# Patient Record
Sex: FEMALE | Race: WHITE | Employment: OTHER | URBAN - METROPOLITAN AREA
[De-identification: names, ages, dates, MRNs, and addresses within clinical notes are randomized per-mention and may not be internally consistent; named-entity substitution may affect disease eponyms.]

---

## 2023-09-18 ENCOUNTER — TRANSCRIBE ORDERS (OUTPATIENT)
Facility: HOSPITAL | Age: 68
End: 2023-09-18

## 2023-09-18 DIAGNOSIS — M25.572 LEFT ANKLE PAIN, UNSPECIFIED CHRONICITY: ICD-10-CM

## 2023-09-18 DIAGNOSIS — S92.022B: Primary | ICD-10-CM

## 2023-09-18 DIAGNOSIS — M79.672 LEFT FOOT PAIN: ICD-10-CM

## 2023-09-19 ENCOUNTER — HOSPITAL ENCOUNTER (OUTPATIENT)
Facility: HOSPITAL | Age: 68
Discharge: HOME | End: 2023-09-22
Attending: PODIATRIST
Payer: MEDICARE

## 2023-09-19 DIAGNOSIS — M79.672 LEFT FOOT PAIN: ICD-10-CM

## 2023-09-19 DIAGNOSIS — M25.572 LEFT ANKLE PAIN, UNSPECIFIED CHRONICITY: ICD-10-CM

## 2023-09-19 DIAGNOSIS — S92.022B: ICD-10-CM

## 2023-09-19 PROCEDURE — 73718 MRI LOWER EXTREMITY W/O DYE: CPT

## 2024-06-17 ENCOUNTER — HOSPITAL ENCOUNTER (INPATIENT)
Facility: HOSPITAL | Age: 69
LOS: 3 days | Discharge: HOME | End: 2024-06-21
Attending: EMERGENCY MEDICINE | Admitting: INTERNAL MEDICINE
Payer: MEDICARE

## 2024-06-17 ENCOUNTER — APPOINTMENT (OUTPATIENT)
Facility: HOSPITAL | Age: 69
End: 2024-06-17
Payer: MEDICARE

## 2024-06-17 DIAGNOSIS — E86.0 MODERATE DEHYDRATION: ICD-10-CM

## 2024-06-17 DIAGNOSIS — N17.9 AKI (ACUTE KIDNEY INJURY) (HCC): ICD-10-CM

## 2024-06-17 DIAGNOSIS — K65.4 MESENTERIC PANNICULITIS (HCC): ICD-10-CM

## 2024-06-17 DIAGNOSIS — R11.2 NAUSEA AND VOMITING, UNSPECIFIED VOMITING TYPE: Primary | ICD-10-CM

## 2024-06-17 DIAGNOSIS — N12 PYELONEPHRITIS: ICD-10-CM

## 2024-06-17 DIAGNOSIS — R10.13 ABDOMINAL PAIN, EPIGASTRIC: ICD-10-CM

## 2024-06-17 LAB
ALBUMIN SERPL-MCNC: 2.2 G/DL (ref 3.5–5)
ALBUMIN/GLOB SERPL: 0.4 (ref 1.1–2.2)
ALP SERPL-CCNC: 66 U/L (ref 45–117)
ALT SERPL-CCNC: 17 U/L (ref 12–78)
ANION GAP SERPL CALC-SCNC: 12 MMOL/L (ref 5–15)
APPEARANCE UR: ABNORMAL
AST SERPL-CCNC: 13 U/L (ref 15–37)
BACTERIA URNS QL MICRO: ABNORMAL /HPF
BASOPHILS # BLD: 0.1 K/UL (ref 0–0.1)
BASOPHILS NFR BLD: 1 % (ref 0–1)
BILIRUB SERPL-MCNC: 0.5 MG/DL (ref 0.2–1)
BILIRUB UR QL CFM: NEGATIVE
BUN SERPL-MCNC: 41 MG/DL (ref 6–20)
BUN/CREAT SERPL: 18 (ref 12–20)
CALCIUM SERPL-MCNC: 9.5 MG/DL (ref 8.5–10.1)
CHLORIDE SERPL-SCNC: 96 MMOL/L (ref 97–108)
CO2 SERPL-SCNC: 28 MMOL/L (ref 21–32)
COLOR UR: ABNORMAL
CREAT SERPL-MCNC: 2.33 MG/DL (ref 0.55–1.02)
DIFFERENTIAL METHOD BLD: ABNORMAL
EOSINOPHIL # BLD: 0.3 K/UL (ref 0–0.4)
EOSINOPHIL NFR BLD: 2 % (ref 0–7)
EPITH CASTS URNS QL MICRO: ABNORMAL /LPF
ERYTHROCYTE [DISTWIDTH] IN BLOOD BY AUTOMATED COUNT: 13.7 % (ref 11.5–14.5)
GLOBULIN SER CALC-MCNC: 6 G/DL (ref 2–4)
GLUCOSE SERPL-MCNC: 124 MG/DL (ref 65–100)
GLUCOSE UR STRIP.AUTO-MCNC: NEGATIVE MG/DL
GRAN CASTS URNS QL MICRO: ABNORMAL /LPF
HCT VFR BLD AUTO: 36.2 % (ref 35–47)
HGB BLD-MCNC: 11.5 G/DL (ref 11.5–16)
HGB UR QL STRIP: NEGATIVE
IMM GRANULOCYTES # BLD AUTO: 0.2 K/UL (ref 0–0.04)
IMM GRANULOCYTES NFR BLD AUTO: 1 % (ref 0–0.5)
KETONES UR QL STRIP.AUTO: 15 MG/DL
LEUKOCYTE ESTERASE UR QL STRIP.AUTO: ABNORMAL
LIPASE SERPL-CCNC: 58 U/L (ref 13–75)
LYMPHOCYTES # BLD: 1.9 K/UL (ref 0.8–3.5)
LYMPHOCYTES NFR BLD: 13 % (ref 12–49)
MCH RBC QN AUTO: 28.5 PG (ref 26–34)
MCHC RBC AUTO-ENTMCNC: 31.8 G/DL (ref 30–36.5)
MCV RBC AUTO: 89.6 FL (ref 80–99)
MONOCYTES # BLD: 0.5 K/UL (ref 0–1)
MONOCYTES NFR BLD: 4 % (ref 5–13)
NEUTS SEG # BLD: 11.4 K/UL (ref 1.8–8)
NEUTS SEG NFR BLD: 79 % (ref 32–75)
NITRITE UR QL STRIP.AUTO: NEGATIVE
NRBC # BLD: 0 K/UL (ref 0–0.01)
NRBC BLD-RTO: 0 PER 100 WBC
PH UR STRIP: 5.5 (ref 5–8)
PLATELET # BLD AUTO: 566 K/UL (ref 150–400)
PMV BLD AUTO: 9.6 FL (ref 8.9–12.9)
POTASSIUM SERPL-SCNC: 4.1 MMOL/L (ref 3.5–5.1)
PROT SERPL-MCNC: 8.2 G/DL (ref 6.4–8.2)
PROT UR STRIP-MCNC: 100 MG/DL
RBC # BLD AUTO: 4.04 M/UL (ref 3.8–5.2)
RBC #/AREA URNS HPF: ABNORMAL /HPF (ref 0–5)
SODIUM SERPL-SCNC: 136 MMOL/L (ref 136–145)
SP GR UR REFRACTOMETRY: 1.02 (ref 1–1.03)
SPECIMEN HOLD: NORMAL
UROBILINOGEN UR QL STRIP.AUTO: 0.2 EU/DL (ref 0.2–1)
WBC # BLD AUTO: 14.4 K/UL (ref 3.6–11)
WBC URNS QL MICRO: >100 /HPF (ref 0–4)

## 2024-06-17 PROCEDURE — 87088 URINE BACTERIA CULTURE: CPT

## 2024-06-17 PROCEDURE — 96361 HYDRATE IV INFUSION ADD-ON: CPT

## 2024-06-17 PROCEDURE — 80053 COMPREHEN METABOLIC PANEL: CPT

## 2024-06-17 PROCEDURE — 87086 URINE CULTURE/COLONY COUNT: CPT

## 2024-06-17 PROCEDURE — 87186 SC STD MICRODIL/AGAR DIL: CPT

## 2024-06-17 PROCEDURE — 81001 URINALYSIS AUTO W/SCOPE: CPT

## 2024-06-17 PROCEDURE — 83690 ASSAY OF LIPASE: CPT

## 2024-06-17 PROCEDURE — 99285 EMERGENCY DEPT VISIT HI MDM: CPT

## 2024-06-17 PROCEDURE — 85025 COMPLETE CBC W/AUTO DIFF WBC: CPT

## 2024-06-17 PROCEDURE — 36415 COLL VENOUS BLD VENIPUNCTURE: CPT

## 2024-06-17 PROCEDURE — 74176 CT ABD & PELVIS W/O CONTRAST: CPT

## 2024-06-17 PROCEDURE — 2580000003 HC RX 258: Performed by: EMERGENCY MEDICINE

## 2024-06-17 RX ORDER — 0.9 % SODIUM CHLORIDE 0.9 %
1000 INTRAVENOUS SOLUTION INTRAVENOUS ONCE
Status: COMPLETED | OUTPATIENT
Start: 2024-06-17 | End: 2024-06-17

## 2024-06-17 RX ADMIN — SODIUM CHLORIDE 1000 ML: 9 INJECTION, SOLUTION INTRAVENOUS at 22:19

## 2024-06-17 RX ADMIN — SODIUM CHLORIDE 1000 ML: 9 INJECTION, SOLUTION INTRAVENOUS at 20:54

## 2024-06-17 ASSESSMENT — PAIN - FUNCTIONAL ASSESSMENT: PAIN_FUNCTIONAL_ASSESSMENT: NONE - DENIES PAIN

## 2024-06-18 ENCOUNTER — APPOINTMENT (OUTPATIENT)
Facility: HOSPITAL | Age: 69
End: 2024-06-18
Payer: MEDICARE

## 2024-06-18 PROBLEM — K65.4 MESENTERIC PANNICULITIS (HCC): Status: ACTIVE | Noted: 2024-06-18

## 2024-06-18 LAB
ALBUMIN SERPL-MCNC: 2 G/DL (ref 3.5–5)
ALBUMIN/GLOB SERPL: 0.4 (ref 1.1–2.2)
ALP SERPL-CCNC: 56 U/L (ref 45–117)
ALT SERPL-CCNC: 13 U/L (ref 12–78)
AMPHET UR QL SCN: NEGATIVE
ANION GAP SERPL CALC-SCNC: 7 MMOL/L (ref 5–15)
AST SERPL-CCNC: 10 U/L (ref 15–37)
BARBITURATES UR QL SCN: NEGATIVE
BASOPHILS # BLD: 0 K/UL (ref 0–0.1)
BASOPHILS NFR BLD: 0 % (ref 0–1)
BENZODIAZ UR QL: NEGATIVE
BILIRUB SERPL-MCNC: 0.3 MG/DL (ref 0.2–1)
BUN SERPL-MCNC: 44 MG/DL (ref 6–20)
BUN/CREAT SERPL: 25 (ref 12–20)
CALCIUM SERPL-MCNC: 9 MG/DL (ref 8.5–10.1)
CANNABINOIDS UR QL SCN: NEGATIVE
CHLORIDE SERPL-SCNC: 105 MMOL/L (ref 97–108)
CO2 SERPL-SCNC: 24 MMOL/L (ref 21–32)
COCAINE UR QL SCN: NEGATIVE
CREAT SERPL-MCNC: 1.77 MG/DL (ref 0.55–1.02)
CRP SERPL-MCNC: 29.5 MG/DL (ref 0–0.3)
DIFFERENTIAL METHOD BLD: ABNORMAL
EOSINOPHIL # BLD: 0 K/UL (ref 0–0.4)
EOSINOPHIL NFR BLD: 0 % (ref 0–7)
ERYTHROCYTE [DISTWIDTH] IN BLOOD BY AUTOMATED COUNT: 13.7 % (ref 11.5–14.5)
GLOBULIN SER CALC-MCNC: 5.1 G/DL (ref 2–4)
GLUCOSE SERPL-MCNC: 108 MG/DL (ref 65–100)
HCT VFR BLD AUTO: 33.1 % (ref 35–47)
HGB BLD-MCNC: 11.1 G/DL (ref 11.5–16)
IMM GRANULOCYTES # BLD AUTO: 0 K/UL
IMM GRANULOCYTES NFR BLD AUTO: 0 %
LACTATE SERPL-SCNC: 0.5 MMOL/L (ref 0.4–2)
LYMPHOCYTES # BLD: 2 K/UL (ref 0.8–3.5)
LYMPHOCYTES NFR BLD: 18 % (ref 12–49)
Lab: NORMAL
MAGNESIUM SERPL-MCNC: 2.5 MG/DL (ref 1.6–2.4)
MCH RBC QN AUTO: 30.2 PG (ref 26–34)
MCHC RBC AUTO-ENTMCNC: 33.5 G/DL (ref 30–36.5)
MCV RBC AUTO: 90.2 FL (ref 80–99)
METHADONE UR QL: NEGATIVE
MONOCYTES # BLD: 0.6 K/UL (ref 0–1)
MONOCYTES NFR BLD: 5 % (ref 5–13)
NEUTS SEG # BLD: 8.7 K/UL (ref 1.8–8)
NEUTS SEG NFR BLD: 77 % (ref 32–75)
NRBC # BLD: 0 K/UL (ref 0–0.01)
NRBC BLD-RTO: 0 PER 100 WBC
OPIATES UR QL: NEGATIVE
PCP UR QL: NEGATIVE
PHOSPHATE SERPL-MCNC: 4.6 MG/DL (ref 2.6–4.7)
PLATELET # BLD AUTO: 460 K/UL (ref 150–400)
PMV BLD AUTO: 9.9 FL (ref 8.9–12.9)
POTASSIUM SERPL-SCNC: 4.3 MMOL/L (ref 3.5–5.1)
PROT SERPL-MCNC: 7.1 G/DL (ref 6.4–8.2)
RBC # BLD AUTO: 3.67 M/UL (ref 3.8–5.2)
RBC MORPH BLD: ABNORMAL
SODIUM SERPL-SCNC: 136 MMOL/L (ref 136–145)
TSH SERPL DL<=0.05 MIU/L-ACNC: 0.85 UIU/ML (ref 0.36–3.74)
WBC # BLD AUTO: 11.3 K/UL (ref 3.6–11)

## 2024-06-18 PROCEDURE — 6360000002 HC RX W HCPCS: Performed by: INTERNAL MEDICINE

## 2024-06-18 PROCEDURE — 36415 COLL VENOUS BLD VENIPUNCTURE: CPT

## 2024-06-18 PROCEDURE — 1100000000 HC RM PRIVATE

## 2024-06-18 PROCEDURE — 6360000002 HC RX W HCPCS: Performed by: EMERGENCY MEDICINE

## 2024-06-18 PROCEDURE — 6360000002 HC RX W HCPCS: Performed by: FAMILY MEDICINE

## 2024-06-18 PROCEDURE — 2580000003 HC RX 258: Performed by: EMERGENCY MEDICINE

## 2024-06-18 PROCEDURE — 80307 DRUG TEST PRSMV CHEM ANLYZR: CPT

## 2024-06-18 PROCEDURE — 87040 BLOOD CULTURE FOR BACTERIA: CPT

## 2024-06-18 PROCEDURE — 2580000003 HC RX 258: Performed by: FAMILY MEDICINE

## 2024-06-18 PROCEDURE — 83605 ASSAY OF LACTIC ACID: CPT

## 2024-06-18 PROCEDURE — C9113 INJ PANTOPRAZOLE SODIUM, VIA: HCPCS | Performed by: INTERNAL MEDICINE

## 2024-06-18 PROCEDURE — 80053 COMPREHEN METABOLIC PANEL: CPT

## 2024-06-18 PROCEDURE — 85025 COMPLETE CBC W/AUTO DIFF WBC: CPT

## 2024-06-18 PROCEDURE — 84443 ASSAY THYROID STIM HORMONE: CPT

## 2024-06-18 PROCEDURE — 96374 THER/PROPH/DIAG INJ IV PUSH: CPT

## 2024-06-18 PROCEDURE — 99221 1ST HOSP IP/OBS SF/LOW 40: CPT | Performed by: NURSE PRACTITIONER

## 2024-06-18 PROCEDURE — 71045 X-RAY EXAM CHEST 1 VIEW: CPT

## 2024-06-18 PROCEDURE — 84100 ASSAY OF PHOSPHORUS: CPT

## 2024-06-18 PROCEDURE — 86140 C-REACTIVE PROTEIN: CPT

## 2024-06-18 PROCEDURE — 83735 ASSAY OF MAGNESIUM: CPT

## 2024-06-18 PROCEDURE — 2580000003 HC RX 258: Performed by: INTERNAL MEDICINE

## 2024-06-18 RX ORDER — ACETAMINOPHEN 650 MG/1
650 SUPPOSITORY RECTAL EVERY 6 HOURS PRN
Status: DISCONTINUED | OUTPATIENT
Start: 2024-06-18 | End: 2024-06-21 | Stop reason: HOSPADM

## 2024-06-18 RX ORDER — OXYCODONE HYDROCHLORIDE 5 MG/1
5 TABLET ORAL EVERY 4 HOURS PRN
Status: DISCONTINUED | OUTPATIENT
Start: 2024-06-18 | End: 2024-06-21 | Stop reason: HOSPADM

## 2024-06-18 RX ORDER — SODIUM CHLORIDE 9 MG/ML
INJECTION, SOLUTION INTRAVENOUS
Status: COMPLETED | OUTPATIENT
Start: 2024-06-18 | End: 2024-06-18

## 2024-06-18 RX ORDER — VALSARTAN 40 MG/1
TABLET ORAL DAILY
COMMUNITY

## 2024-06-18 RX ORDER — HYDROCHLOROTHIAZIDE 12.5 MG/1
12.5 TABLET ORAL DAILY
COMMUNITY

## 2024-06-18 RX ORDER — ONDANSETRON 4 MG/1
4 TABLET, ORALLY DISINTEGRATING ORAL EVERY 8 HOURS PRN
Status: DISCONTINUED | OUTPATIENT
Start: 2024-06-18 | End: 2024-06-21 | Stop reason: HOSPADM

## 2024-06-18 RX ORDER — HEPARIN SODIUM 5000 [USP'U]/ML
5000 INJECTION, SOLUTION INTRAVENOUS; SUBCUTANEOUS EVERY 8 HOURS SCHEDULED
Status: DISCONTINUED | OUTPATIENT
Start: 2024-06-18 | End: 2024-06-21 | Stop reason: HOSPADM

## 2024-06-18 RX ORDER — POLYETHYLENE GLYCOL 3350 17 G/17G
17 POWDER, FOR SOLUTION ORAL DAILY PRN
Status: DISCONTINUED | OUTPATIENT
Start: 2024-06-18 | End: 2024-06-21 | Stop reason: HOSPADM

## 2024-06-18 RX ORDER — HYDROMORPHONE HYDROCHLORIDE 1 MG/ML
1 INJECTION, SOLUTION INTRAMUSCULAR; INTRAVENOUS; SUBCUTANEOUS EVERY 4 HOURS PRN
Status: DISCONTINUED | OUTPATIENT
Start: 2024-06-18 | End: 2024-06-21 | Stop reason: HOSPADM

## 2024-06-18 RX ORDER — SODIUM CHLORIDE 0.9 % (FLUSH) 0.9 %
5-40 SYRINGE (ML) INJECTION EVERY 12 HOURS SCHEDULED
Status: DISCONTINUED | OUTPATIENT
Start: 2024-06-18 | End: 2024-06-21 | Stop reason: HOSPADM

## 2024-06-18 RX ORDER — SODIUM CHLORIDE 0.9 % (FLUSH) 0.9 %
5-40 SYRINGE (ML) INJECTION PRN
Status: DISCONTINUED | OUTPATIENT
Start: 2024-06-18 | End: 2024-06-21 | Stop reason: HOSPADM

## 2024-06-18 RX ORDER — ONDANSETRON 2 MG/ML
4 INJECTION INTRAMUSCULAR; INTRAVENOUS EVERY 6 HOURS PRN
Status: DISCONTINUED | OUTPATIENT
Start: 2024-06-18 | End: 2024-06-21 | Stop reason: HOSPADM

## 2024-06-18 RX ORDER — ACETAMINOPHEN 325 MG/1
650 TABLET ORAL EVERY 6 HOURS PRN
Status: DISCONTINUED | OUTPATIENT
Start: 2024-06-18 | End: 2024-06-21 | Stop reason: HOSPADM

## 2024-06-18 RX ORDER — SODIUM CHLORIDE 9 MG/ML
INJECTION, SOLUTION INTRAVENOUS PRN
Status: DISCONTINUED | OUTPATIENT
Start: 2024-06-18 | End: 2024-06-21 | Stop reason: HOSPADM

## 2024-06-18 RX ORDER — SODIUM CHLORIDE, SODIUM LACTATE, POTASSIUM CHLORIDE, CALCIUM CHLORIDE 600; 310; 30; 20 MG/100ML; MG/100ML; MG/100ML; MG/100ML
INJECTION, SOLUTION INTRAVENOUS CONTINUOUS
Status: DISCONTINUED | OUTPATIENT
Start: 2024-06-18 | End: 2024-06-20

## 2024-06-18 RX ORDER — SEMAGLUTIDE 1.34 MG/ML
0.5 INJECTION, SOLUTION SUBCUTANEOUS WEEKLY
Status: ON HOLD | COMMUNITY
End: 2024-06-21 | Stop reason: HOSPADM

## 2024-06-18 RX ADMIN — PANTOPRAZOLE SODIUM 40 MG: 40 INJECTION, POWDER, FOR SOLUTION INTRAVENOUS at 09:32

## 2024-06-18 RX ADMIN — SODIUM CHLORIDE, PRESERVATIVE FREE 10 ML: 5 INJECTION INTRAVENOUS at 22:28

## 2024-06-18 RX ADMIN — SODIUM CHLORIDE: 9 INJECTION, SOLUTION INTRAVENOUS at 00:30

## 2024-06-18 RX ADMIN — HEPARIN SODIUM 5000 UNITS: 5000 INJECTION INTRAVENOUS; SUBCUTANEOUS at 22:28

## 2024-06-18 RX ADMIN — HEPARIN SODIUM 5000 UNITS: 5000 INJECTION INTRAVENOUS; SUBCUTANEOUS at 06:45

## 2024-06-18 RX ADMIN — HEPARIN SODIUM 5000 UNITS: 5000 INJECTION INTRAVENOUS; SUBCUTANEOUS at 16:14

## 2024-06-18 RX ADMIN — WATER 2000 MG: 1 INJECTION INTRAMUSCULAR; INTRAVENOUS; SUBCUTANEOUS at 09:28

## 2024-06-18 RX ADMIN — SODIUM CHLORIDE, POTASSIUM CHLORIDE, SODIUM LACTATE AND CALCIUM CHLORIDE: 600; 310; 30; 20 INJECTION, SOLUTION INTRAVENOUS at 06:51

## 2024-06-18 RX ADMIN — SODIUM CHLORIDE, PRESERVATIVE FREE 10 ML: 5 INJECTION INTRAVENOUS at 09:32

## 2024-06-18 RX ADMIN — SODIUM CHLORIDE, POTASSIUM CHLORIDE, SODIUM LACTATE AND CALCIUM CHLORIDE: 600; 310; 30; 20 INJECTION, SOLUTION INTRAVENOUS at 16:14

## 2024-06-18 RX ADMIN — WATER 1000 MG: 1 INJECTION INTRAMUSCULAR; INTRAVENOUS; SUBCUTANEOUS at 00:21

## 2024-06-18 NOTE — CONSULTS
[facility]  Inpatient Consultation        Admit Date: 6/17/2024  Reason for Consultation: mesenteric panniculitis    HPI:  Shala Sr is a 69 y.o. female w/ hx of HTN whom we are asked to see in consultation by Dr. Zheng for the above complaint.  Pt presented to the ED from a [facility] w/ c/o abd pain, n/v, and fever on and off for 3 weeks. Her abd pain is in the upper abdomen.  Fevers mostly occur at night and are as high as 102 a/w severe sweats.    She took care of her grandkids who had strep prior to the start of her sx.  She also has developed some mouth sores during the 3 weeks. She has been on ozempic for 2 weeks.   WBC 14.4-> 11.3.  on cefepime, +GERRI    CT  Inflammatory changes of the mesentery with prominent lymph nodes consistent with  mesenteric panniculitis.         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   No past surgical history on file.   Social History     Tobacco Use    Smoking status: Not on file    Smokeless tobacco: Not on file   Substance Use Topics    Alcohol use: Not on file      No family history on file.   Prior to Admission medications    Medication Sig Start Date End Date Taking? Authorizing Provider   valsartan (DIOVAN) 40 MG tablet Take by mouth daily   Yes Oliver Almeida MD   hydroCHLOROthiazide 12.5 MG tablet Take 1 tablet by mouth daily   Yes Oliver Almeida MD   Semaglutide,0.25 or 0.5MG/DOS, (OZEMPIC, 0.25 OR 0.5 MG/DOSE,) 2 MG/1.5ML SOPN Inject 0.5 mg into the skin Once a week at 5 PM   Yes Oliver Almeida MD   Naproxen Sodium (ALEVE PO) Take by mouth   Yes Oliver Almeida MD     Current Facility-Administered Medications   Medication Dose Route Frequency    sodium chloride flush 0.9 % injection 5-40 mL  5-40 mL IntraVENous 2 times per day    sodium chloride flush 0.9 % injection 5-40 mL  5-40 mL IntraVENous PRN    0.9 %  enteric pathogens.  Call if needed.

## 2024-06-18 NOTE — CONSULTS
[place] [address] [facility], [place] [address]  Phone: [phone]   [phone]     [email or web address]    NEPHROLOGY CONSULT NOTE     Patient: Shala Sr MRN: [id number]  PCP: Jerome Shaw DO   :     1955  Age:   69 y.o.  Sex:  female      Referring physician: Latanya Kwok MD  Reason for consultation: 69 y.o. female with Abdominal pain, epigastric [R10.13]  Pyelonephritis [N12]  Mesenteric panniculitis (HCC) [K65.4]  Moderate dehydration [E86.0]  GERRI (acute kidney injury) (HCC) [N17.9]  Nausea and vomiting, unspecified vomiting type [R11.2]  Admission Date: 2024  8:36 PM  LOS: 0 days      ASSESSMENT and PLAN :   GERRI :  - likely ATN from volume depletion and ARB, NSAID use   - resolving w/ IVF   - No obstruction on CT     Fevers   Vomiting/ Diarrhea  Mesenteric Panniculitis   - consider stool cx for enteric pathogens (salmonella etc) and blood cx   - mx per primary team     HTN   - hold valsartan and HCTZ    Obesity   - hold semaglutide     Care Plan discussed with:  pt        Thank you for consulting [facility] in the care of your patient.      Subjective:   HPI: Shala Sr is a 69 y.o.  female who has been admitted to the hospital for abdo pain, N/V/D, fevers for 2-3 weeks   Found to have GERRI and mesenteric panniculitis on CT on admission   We were asked to see her foR GERRI     Otherwise healthy individual w/HTN and obesity   Has been on ozempic for weight loss  Grand daughters were sick w/ similar illness 3 weeks ago. Grand childrens  had strep at the same time   On going sx prompted visit to PCP early in the course and Rx as presumed Vital GE. [facility], no additional testing done   Reports eating uncooked chicken sandwich at [employer] at the onset of illness     Has been taking her home meds through the illness  Taking ibuprofen and alleve for aches and pains - chronic use    Naproxen Sodium (ALEVE PO) Take by mouth   Yes Provider, MD Oliver        Thank you for allowing us to participate in the care of this patient.   We will follow patient. Please don’t hesitate to call with any questions    Walt Colón MD  [facility]   [address] [place] [address], [facility] [address]  Phone - [phone]   Fax - [phone]  [email or web address]

## 2024-06-18 NOTE — ED NOTES
ED Course as of 06/18/24 0252   Mon Jun 17, 2024 2322 Creatinine(!): 2.33  GERRI [ZD]   2323 Signout received from Dr. Garcia.  Patient pending abd, gi symptoms with GERRI  In summary: CT and UA   [ZD]   Tue Jun 18, 2024   0010 WBC(!): 14.4 [ZD]   0010 WBC, UA(!): >100 [ZD]   0010 Leukocyte Esterase, Urine(!): SMALL [ZD]   0010 Bacteria, UA(!): 4+ [ZD]   0138 Lactic Acid, Plasma: 0.5 [ZD]      ED Course User Index  [ZD] Benji Mathias MD         Vitals:    06/17/24 2245 06/17/24 2315 06/17/24 2330 06/17/24 2345   BP:       Pulse:   87    Resp:       Temp:       TempSrc:       SpO2: 95% 92% 94% 93%   Weight:       Height:               Results for orders placed or performed during the hospital encounter of 06/17/24   Urine Culture Hold Sample    Specimen: Urine   Result Value Ref Range    Specimen HOld        Urine on hold in Microbiology dept for 2 days.  If unpreserved urine is submitted, it cannot be used for addtional testing after 24 hours, recollection will be required.   CBC with Auto Differential   Result Value Ref Range    WBC 14.4 (H) 3.6 - 11.0 K/uL    RBC 4.04 3.80 - 5.20 M/uL    Hemoglobin 11.5 11.5 - 16.0 g/dL    Hematocrit 36.2 35.0 - 47.0 %    MCV 89.6 80.0 - 99.0 FL    MCH 28.5 26.0 - 34.0 PG    MCHC 31.8 30.0 - 36.5 g/dL    RDW 13.7 11.5 - 14.5 %    Platelets 566 (H) 150 - 400 K/uL    MPV 9.6 8.9 - 12.9 FL    Nucleated RBCs 0.0 0  WBC    nRBC 0.00 0.00 - 0.01 K/uL    Neutrophils % 79 (H) 32 - 75 %    Lymphocytes % 13 12 - 49 %    Monocytes % 4 (L) 5 - 13 %    Eosinophils % 2 0 - 7 %    Basophils % 1 0 - 1 %    Immature Granulocytes % 1 (H) 0.0 - 0.5 %    Neutrophils Absolute 11.4 (H) 1.8 - 8.0 K/UL    Lymphocytes Absolute 1.9 0.8 - 3.5 K/UL    Monocytes Absolute 0.5 0.0 - 1.0 K/UL    Eosinophils Absolute 0.3 0.0 - 0.4 K/UL    Basophils Absolute 0.1 0.0 - 0.1 K/UL    Immature Granulocytes Absolute 0.2 (H) 0.00 - 0.04 K/UL    Differential Type AUTOMATED     Comprehensive Metabolic Panel   Result

## 2024-06-18 NOTE — ED PROVIDER NOTES
med/surg  Department: [facility] - [phone]  Consulting Provider: Marce    Other:  69 y.o. female presents with intermittent nausea, vomiting and diarrhea with abdominal pain over the last few weeks. Had a fever over the weekend that resolved.  Will need monitoring of renal function.    PATIENT REFERRED TO:  No follow-up provider specified.    DISCHARGE MEDICATIONS:  New Prescriptions    No medications on file         (Please note that portions of this note were completed with a voice recognition program.  Efforts were made to edit the dictations but occasionally words are mis-transcribed.)    Bala Garcia MD (electronically signed)  Emergency Attending Physician      Bala Garcia MD  06/18/24 0010

## 2024-06-18 NOTE — ED NOTES
TRANSFER - OUT REPORT:    Verbal report given to SOL Bridges on Shala Sr  being transferred to [facility] Room 619 for routine progression of patient care       Report consisted of patient's Situation, Background, Assessment and   Recommendations(SBAR).     Information from the following report(s) Nurse Handoff Report, ED Encounter Summary, ED SBAR, MAR, and Recent Results was reviewed with the receiving nurse.          Lines:   Peripheral IV 06/17/24 Left;Proximal;Anterior Forearm (Active)        Opportunity for questions and clarification was provided.

## 2024-06-18 NOTE — ED NOTES
Patient updated on plan of care: Admission to [facility] - awaiting Admit orders, room assignment, and transports. Patient verbalizes understanding of admission process.    Patient remains on NIBP Q 1 hour and continuous SPO2 monitoring. Bed is in low and locked position, side rail x 1 with bedside table. Call bell within reach. Family has left for night.

## 2024-06-18 NOTE — ED TRIAGE NOTES
PT ambulatory to triage for intermittent nausea, vomiting, abdominal pain x3 weeks. Pt reports intermittent fever as well. Pt states she has sores in her mouth that appeared this week. [facility] and [facility] flu and Covid. Pt reports she also recently started Ozempic x2 months ago.

## 2024-06-18 NOTE — PROGRESS NOTES
Hospitalist Progress Note  CHE Villalba - CNP  Answering service:         Date of Service:  2024  NAME:  Shala Sr  :  1955  MRN:  [id number]      Admission Summary:   Shala Sr is a 69 y.o. female with past medical history significant for hypertension presented at [facility] emergency room at [facility] with multiple medical complaints including nausea and vomiting and abdominal pain.  Patient symptoms started about 3 weeks ago and progressively getting worse.  Patient has also been having fever at home as well as rigors and chills.  The abdominal pain is diffuse in location, no radiation, no known aggravating or relieving factors, dull ache on the right 5/10 in severity.  Also the patient started taking Ozempic for weight loss 2 months ago.  She was seen at [facility] this past Saturday and tested negative for flu and COVID.  Patient came to the emergency room because of worsening symptoms.  CT scan of the abdomen and pelvis done on arrival at the emergency room showed inflammatory changes of the mesentery with prominent lymph nodes consistent with mesenteric panniculitis.  Urinalysis is consistent with acute cystitis.  Patient was started on Rocephin and referred to the hospitalist service for admission          Interval history / Subjective:   Patient seen lying in bed in NAD.  States she has just been extremely fatigued and not feeling well.  Has some diffuse upper abdominal pain and states she sometimes feels her stomach \"is eating itself\".  Discussed plan of care with surgery and nephrology to see.  Spoke via phone with patient's son Dr Sly Sr with patient's permission to review results and plan.       Assessment & Plan:     Mesenteric panniculitis   - cont Cefepime  - Surgery consulted : cont abx and hydration as well as bowel rest  - check  acetaminophen (TYLENOL) tablet 650 mg  650 mg Oral Q6H PRN    Or    acetaminophen (TYLENOL) suppository 650 mg  650 mg Rectal Q6H PRN    heparin (porcine) injection 5,000 Units  5,000 Units SubCUTAneous 3 times per day    lactated ringers IV soln infusion   IntraVENous Continuous    pantoprazole (PROTONIX) 40 mg in sodium chloride (PF) 0.9 % 10 mL injection  40 mg IntraVENous Daily    oxyCODONE (ROXICODONE) immediate release tablet 5 mg  5 mg Oral Q4H PRN    HYDROmorphone HCl PF (DILAUDID) injection 1 mg  1 mg IntraVENous Q4H PRN    magic (miracle) mouthwash  5 mL Swish & Spit 4x Daily PRN    [START ON 6/19/2024] ceFEPIme (MAXIPIME) 2,000 mg in sodium chloride 0.9 % 100 mL IVPB (mini-bag)  2,000 mg IntraVENous Q24H     ______________________________________________________________________  EXPECTED LENGTH OF STAY: 4  ACTUAL LENGTH OF STAY:          0                 CHE Villalba - CNP

## 2024-06-18 NOTE — PROGRESS NOTES
Renal Dosing/Monitoring  Medication: cefepime   Current regimen:  2 grams every 12 hr  Recent Labs     06/17/24 2054   CREATININE 2.33*   BUN 41*     Estimated CrCl:  25 ml/min  Plan: Change to 2 grams x 1; then 1 gram every 24 hr for CrCl 11-29 mL/min.

## 2024-06-18 NOTE — H&P
coordination discussions were held with appropriate clinical/nonclinical/ nursing providers based on care coordination needs.     Assessment:   Given the patient's current clinical presentation, there is a high level of concern for decompensation if discharged from the emergency department. Complex decision making was performed, which includes reviewing the patient's available past medical records, laboratory results, and imaging studies.    Principal Problem:    Mesenteric panniculitis (HCC)  Resolved Problems:    * No resolved hospital problems. *      A/P   1.  Mesenteric panniculitis POA: Will admit the patient for further evaluation and treatment.  Will start the patient on cefepime.  General surgery consult will be requested to assist in further evaluation and treatment.    2.  Acute kidney injury POA: CT of the abdomen and pelvis did not show obstructive lesion.  Fluid therapy and nephrology consult will be requested to assist in further evaluation and treatment.    3.  Essential hypertension POA: We will hold  hydrochlorothiazide and Diovan because of the acute kidney injury.  Will monitor the patient blood pressure closely.    4.  Obesity POA: Patient presented with BMI of 31.22.  Patient will benefit from lifestyle modification including diet and exercise to promote weight loss.  Patient is also presently on Ozempic for weight loss.    5.  Acute cystitis without hematuria POA: Patient is on cefepime for mesenteric panniculitis.  This will also cover the patient for acute cystitis without hematuria.  Will await urinary culture.    6.  Thrombocytosis POA: This most likely reactive.  Will monitor platelet count.        DIET: Diet NPO Exceptions are: Ice Chips, Sips of Water with Meds   ISOLATION PRECAUTIONS: No active isolations  CODE STATUS: Full Code   Central Line:   None  DVT PROPHYLAXIS: Heparin  FUNCTIONAL STATUS PRIOR TO HOSPITALIZATION: Fully active and ambulatory; able to carry on all self-care  without restriction.  Ambulatory status/function: By self   EARLY MOBILITY ASSESSMENT: Recommend routine ambulation while hospitalized with the assistance of nursing staff  ANTICIPATED DISCHARGE: Greater than 48 hours.  ANTICIPATED DISPOSITION: Home  EMERGENCY CONTACT/SURROGATE DECISION MAKER: Eddie Son, domestic partner    CRITICAL CARE WAS PERFORMED FOR THIS ENCOUNTER: NO.      Signed By: Kaity Zheng MD     June 18, 2024         Please note that this dictation may have been completed with Dragon, the computer voice recognition software.  Quite often unanticipated grammatical, syntax, homophones, and other interpretive errors are inadvertently transcribed by the computer software.  Please disregard these errors.  Please excuse any errors that have escaped final proofreading.

## 2024-06-19 LAB
ALBUMIN SERPL-MCNC: 1.9 G/DL (ref 3.5–5)
ALBUMIN/GLOB SERPL: 0.4 (ref 1.1–2.2)
ALP SERPL-CCNC: 50 U/L (ref 45–117)
ALT SERPL-CCNC: 15 U/L (ref 12–78)
ANION GAP SERPL CALC-SCNC: 5 MMOL/L (ref 5–15)
AST SERPL-CCNC: 16 U/L (ref 15–37)
BASOPHILS # BLD: 0.1 K/UL (ref 0–0.1)
BASOPHILS NFR BLD: 1 % (ref 0–1)
BILIRUB SERPL-MCNC: 0.4 MG/DL (ref 0.2–1)
BUN SERPL-MCNC: 39 MG/DL (ref 6–20)
BUN/CREAT SERPL: 33 (ref 12–20)
CALCIUM SERPL-MCNC: 8.8 MG/DL (ref 8.5–10.1)
CHLORIDE SERPL-SCNC: 107 MMOL/L (ref 97–108)
CO2 SERPL-SCNC: 26 MMOL/L (ref 21–32)
CREAT SERPL-MCNC: 1.19 MG/DL (ref 0.55–1.02)
DIFFERENTIAL METHOD BLD: ABNORMAL
EOSINOPHIL # BLD: 1.8 K/UL (ref 0–0.4)
EOSINOPHIL NFR BLD: 15 % (ref 0–7)
ERYTHROCYTE [DISTWIDTH] IN BLOOD BY AUTOMATED COUNT: 13.8 % (ref 11.5–14.5)
GLOBULIN SER CALC-MCNC: 4.8 G/DL (ref 2–4)
GLUCOSE SERPL-MCNC: 75 MG/DL (ref 65–100)
HCT VFR BLD AUTO: 31.6 % (ref 35–47)
HGB BLD-MCNC: 10.3 G/DL (ref 11.5–16)
IMM GRANULOCYTES # BLD AUTO: 0.1 K/UL (ref 0–0.04)
IMM GRANULOCYTES NFR BLD AUTO: 1 % (ref 0–0.5)
LYMPHOCYTES # BLD: 2.3 K/UL (ref 0.8–3.5)
LYMPHOCYTES NFR BLD: 19 % (ref 12–49)
MCH RBC QN AUTO: 29.1 PG (ref 26–34)
MCHC RBC AUTO-ENTMCNC: 32.6 G/DL (ref 30–36.5)
MCV RBC AUTO: 89.3 FL (ref 80–99)
MONOCYTES # BLD: 0.7 K/UL (ref 0–1)
MONOCYTES NFR BLD: 6 % (ref 5–13)
NEUTS SEG # BLD: 6.9 K/UL (ref 1.8–8)
NEUTS SEG NFR BLD: 58 % (ref 32–75)
NRBC # BLD: 0 K/UL (ref 0–0.01)
NRBC BLD-RTO: 0 PER 100 WBC
PLATELET # BLD AUTO: 503 K/UL (ref 150–400)
PMV BLD AUTO: 9.9 FL (ref 8.9–12.9)
POTASSIUM SERPL-SCNC: 3.8 MMOL/L (ref 3.5–5.1)
PROT SERPL-MCNC: 6.7 G/DL (ref 6.4–8.2)
RBC # BLD AUTO: 3.54 M/UL (ref 3.8–5.2)
RBC MORPH BLD: ABNORMAL
SODIUM SERPL-SCNC: 138 MMOL/L (ref 136–145)
WBC # BLD AUTO: 11.9 K/UL (ref 3.6–11)

## 2024-06-19 PROCEDURE — 1100000000 HC RM PRIVATE

## 2024-06-19 PROCEDURE — 6360000002 HC RX W HCPCS: Performed by: FAMILY MEDICINE

## 2024-06-19 PROCEDURE — 89055 LEUKOCYTE ASSESSMENT FECAL: CPT

## 2024-06-19 PROCEDURE — 36415 COLL VENOUS BLD VENIPUNCTURE: CPT

## 2024-06-19 PROCEDURE — 6360000002 HC RX W HCPCS: Performed by: INTERNAL MEDICINE

## 2024-06-19 PROCEDURE — 2580000003 HC RX 258: Performed by: FAMILY MEDICINE

## 2024-06-19 PROCEDURE — C9113 INJ PANTOPRAZOLE SODIUM, VIA: HCPCS | Performed by: INTERNAL MEDICINE

## 2024-06-19 PROCEDURE — 80053 COMPREHEN METABOLIC PANEL: CPT

## 2024-06-19 PROCEDURE — 6370000000 HC RX 637 (ALT 250 FOR IP): Performed by: NURSE PRACTITIONER

## 2024-06-19 PROCEDURE — 2580000003 HC RX 258: Performed by: INTERNAL MEDICINE

## 2024-06-19 PROCEDURE — 87506 IADNA-DNA/RNA PROBE TQ 6-11: CPT

## 2024-06-19 PROCEDURE — 85025 COMPLETE CBC W/AUTO DIFF WBC: CPT

## 2024-06-19 RX ORDER — PREDNISONE 20 MG/1
20 TABLET ORAL DAILY
Status: DISCONTINUED | OUTPATIENT
Start: 2024-06-19 | End: 2024-06-20

## 2024-06-19 RX ORDER — PREDNISONE 5 MG/1
5 TABLET ORAL DAILY
Status: DISCONTINUED | OUTPATIENT
Start: 2024-06-28 | End: 2024-06-20

## 2024-06-19 RX ORDER — FLUTICASONE PROPIONATE 50 MCG
1 SPRAY, SUSPENSION (ML) NASAL DAILY
Status: DISCONTINUED | OUTPATIENT
Start: 2024-06-19 | End: 2024-06-21 | Stop reason: HOSPADM

## 2024-06-19 RX ORDER — PREDNISONE 20 MG/1
10 TABLET ORAL DAILY
Status: DISCONTINUED | OUTPATIENT
Start: 2024-06-25 | End: 2024-06-20

## 2024-06-19 RX ORDER — PSEUDOEPHEDRINE HCL 30 MG
60 TABLET ORAL EVERY 4 HOURS PRN
Status: DISCONTINUED | OUTPATIENT
Start: 2024-06-19 | End: 2024-06-19

## 2024-06-19 RX ORDER — PSEUDOEPHEDRINE HCL 30 MG
60 TABLET ORAL EVERY 6 HOURS
Status: DISCONTINUED | OUTPATIENT
Start: 2024-06-19 | End: 2024-06-21 | Stop reason: HOSPADM

## 2024-06-19 RX ADMIN — CEFEPIME 2000 MG: 2 INJECTION, POWDER, FOR SOLUTION INTRAVENOUS at 09:24

## 2024-06-19 RX ADMIN — PREDNISONE 20 MG: 20 TABLET ORAL at 09:08

## 2024-06-19 RX ADMIN — SODIUM CHLORIDE, PRESERVATIVE FREE 10 ML: 5 INJECTION INTRAVENOUS at 09:11

## 2024-06-19 RX ADMIN — HEPARIN SODIUM 5000 UNITS: 5000 INJECTION INTRAVENOUS; SUBCUTANEOUS at 14:28

## 2024-06-19 RX ADMIN — PSEUDOEPHEDRINE HCL 60 MG: 30 TABLET, FILM COATED ORAL at 23:35

## 2024-06-19 RX ADMIN — PSEUDOEPHEDRINE HCL 60 MG: 30 TABLET, FILM COATED ORAL at 18:21

## 2024-06-19 RX ADMIN — FLUTICASONE PROPIONATE 1 SPRAY: 50 SPRAY, METERED NASAL at 14:27

## 2024-06-19 RX ADMIN — SODIUM CHLORIDE, POTASSIUM CHLORIDE, SODIUM LACTATE AND CALCIUM CHLORIDE: 600; 310; 30; 20 INJECTION, SOLUTION INTRAVENOUS at 09:15

## 2024-06-19 RX ADMIN — HEPARIN SODIUM 5000 UNITS: 5000 INJECTION INTRAVENOUS; SUBCUTANEOUS at 06:13

## 2024-06-19 RX ADMIN — SODIUM CHLORIDE, POTASSIUM CHLORIDE, SODIUM LACTATE AND CALCIUM CHLORIDE: 600; 310; 30; 20 INJECTION, SOLUTION INTRAVENOUS at 02:24

## 2024-06-19 RX ADMIN — PANTOPRAZOLE SODIUM 40 MG: 40 INJECTION, POWDER, FOR SOLUTION INTRAVENOUS at 09:08

## 2024-06-19 NOTE — PROGRESS NOTES
Diagnosis: Mesenteric panniculitis (HCC)   Discharge Barrier: other:  tolerating PO   Anticipated DC date:6/20/2024  Notes : Labs much better today.  Advancing diet.  If tolerating then can likely DC tomorrow, didn't feel she could go just yet this afternoon and hasn't eaten yet

## 2024-06-19 NOTE — PROGRESS NOTES
Hospitalist Progress Note  CHE Villalba - CNP  Answering service:         Date of Service:  2024  NAME:  Shala Sr  :  1955  MRN:  [id number]      Admission Summary:   Shala Sr is a 69 y.o. female with past medical history significant for hypertension presented at [facility] emergency room at [facility] with multiple medical complaints including nausea and vomiting and abdominal pain.  Patient symptoms started about 3 weeks ago and progressively getting worse.  Patient has also been having fever at home as well as rigors and chills.  The abdominal pain is diffuse in location, no radiation, no known aggravating or relieving factors, dull ache on the right 5/10 in severity.  Also the patient started taking Ozempic for weight loss 2 months ago.  She was seen at [facility] this past Saturday and tested negative for flu and COVID.  Patient came to the emergency room because of worsening symptoms.  CT scan of the abdomen and pelvis done on arrival at the emergency room showed inflammatory changes of the mesentery with prominent lymph nodes consistent with mesenteric panniculitis.  Urinalysis is consistent with acute cystitis.  Patient was started on Rocephin and referred to the hospitalist service for admission          Interval history / Subjective:   Patient seen sitting up in chair with son and daughter at the bedside.  Mouth lesions have resolved.  She would like to try to advance diet and see if she can eat anything.  Lab results reviewed with family.  States she feels like she is getting a cold today with sinus drainage and ear pressure.      Assessment & Plan:     Mesenteric panniculitis   - cont Cefepime  - Surgery consulted : cont abx and hydration  - check stool enteric pathogens : patient has had no more diarrhea so nothing to collect as of yet   -

## 2024-06-20 LAB
ALBUMIN SERPL-MCNC: 2 G/DL (ref 3.5–5)
ALBUMIN/GLOB SERPL: 0.4 (ref 1.1–2.2)
ALP SERPL-CCNC: 53 U/L (ref 45–117)
ALT SERPL-CCNC: 18 U/L (ref 12–78)
ANION GAP SERPL CALC-SCNC: 10 MMOL/L (ref 5–15)
AST SERPL-CCNC: 28 U/L (ref 15–37)
BACTERIA SPEC CULT: ABNORMAL
BASOPHILS # BLD: 0 K/UL (ref 0–0.1)
BASOPHILS NFR BLD: 0 % (ref 0–1)
BILIRUB SERPL-MCNC: 0.2 MG/DL (ref 0.2–1)
BUN SERPL-MCNC: 36 MG/DL (ref 6–20)
BUN/CREAT SERPL: 30 (ref 12–20)
C COLI+JEJUNI TUF STL QL NAA+PROBE: NEGATIVE
CALCIUM SERPL-MCNC: 9.1 MG/DL (ref 8.5–10.1)
CC UR VC: ABNORMAL
CHLORIDE SERPL-SCNC: 107 MMOL/L (ref 97–108)
CO2 SERPL-SCNC: 19 MMOL/L (ref 21–32)
CREAT SERPL-MCNC: 1.19 MG/DL (ref 0.55–1.02)
DIFFERENTIAL METHOD BLD: ABNORMAL
EC STX1+STX2 GENES STL QL NAA+PROBE: NEGATIVE
EOSINOPHIL # BLD: 0 K/UL (ref 0–0.4)
EOSINOPHIL NFR BLD: 0 % (ref 0–7)
ERYTHROCYTE [DISTWIDTH] IN BLOOD BY AUTOMATED COUNT: 13.6 % (ref 11.5–14.5)
ETEC ELTA+ESTB GENES STL QL NAA+PROBE: NEGATIVE
GLOBULIN SER CALC-MCNC: 5.3 G/DL (ref 2–4)
GLUCOSE SERPL-MCNC: 98 MG/DL (ref 65–100)
HCT VFR BLD AUTO: 34 % (ref 35–47)
HGB BLD-MCNC: 11 G/DL (ref 11.5–16)
IMM GRANULOCYTES # BLD AUTO: 0 K/UL
IMM GRANULOCYTES NFR BLD AUTO: 0 %
LYMPHOCYTES # BLD: 2.9 K/UL (ref 0.8–3.5)
LYMPHOCYTES NFR BLD: 28 % (ref 12–49)
MCH RBC QN AUTO: 28.8 PG (ref 26–34)
MCHC RBC AUTO-ENTMCNC: 32.4 G/DL (ref 30–36.5)
MCV RBC AUTO: 89 FL (ref 80–99)
MONOCYTES # BLD: 0.3 K/UL (ref 0–1)
MONOCYTES NFR BLD: 3 % (ref 5–13)
NEUTS SEG # BLD: 7.2 K/UL (ref 1.8–8)
NEUTS SEG NFR BLD: 69 % (ref 32–75)
NRBC # BLD: 0 K/UL (ref 0–0.01)
NRBC BLD-RTO: 0 PER 100 WBC
P SHIGELLOIDES DNA STL QL NAA+PROBE: NEGATIVE
PLATELET # BLD AUTO: 513 K/UL (ref 150–400)
PMV BLD AUTO: 9.4 FL (ref 8.9–12.9)
POTASSIUM SERPL-SCNC: 4.4 MMOL/L (ref 3.5–5.1)
PROT SERPL-MCNC: 7.3 G/DL (ref 6.4–8.2)
RBC # BLD AUTO: 3.82 M/UL (ref 3.8–5.2)
RBC MORPH BLD: ABNORMAL
SALMONELLA SP SPAO STL QL NAA+PROBE: NEGATIVE
SERVICE CMNT-IMP: ABNORMAL
SHIGELLA SP+EIEC IPAH STL QL NAA+PROBE: NEGATIVE
SODIUM SERPL-SCNC: 136 MMOL/L (ref 136–145)
V CHOL+PARA+VUL DNA STL QL NAA+NON-PROBE: NEGATIVE
WBC # BLD AUTO: 10.4 K/UL (ref 3.6–11)
WBC MORPH BLD: ABNORMAL
Y ENTEROCOL DNA STL QL NAA+NON-PROBE: NEGATIVE

## 2024-06-20 PROCEDURE — C9113 INJ PANTOPRAZOLE SODIUM, VIA: HCPCS | Performed by: INTERNAL MEDICINE

## 2024-06-20 PROCEDURE — 6360000002 HC RX W HCPCS: Performed by: NURSE PRACTITIONER

## 2024-06-20 PROCEDURE — 85025 COMPLETE CBC W/AUTO DIFF WBC: CPT

## 2024-06-20 PROCEDURE — 6360000002 HC RX W HCPCS: Performed by: INTERNAL MEDICINE

## 2024-06-20 PROCEDURE — 36415 COLL VENOUS BLD VENIPUNCTURE: CPT

## 2024-06-20 PROCEDURE — 6370000000 HC RX 637 (ALT 250 FOR IP): Performed by: NURSE PRACTITIONER

## 2024-06-20 PROCEDURE — 80053 COMPREHEN METABOLIC PANEL: CPT

## 2024-06-20 PROCEDURE — 1100000000 HC RM PRIVATE

## 2024-06-20 PROCEDURE — 6360000002 HC RX W HCPCS: Performed by: FAMILY MEDICINE

## 2024-06-20 PROCEDURE — 2580000003 HC RX 258: Performed by: INTERNAL MEDICINE

## 2024-06-20 PROCEDURE — 2580000003 HC RX 258: Performed by: FAMILY MEDICINE

## 2024-06-20 PROCEDURE — 2580000003 HC RX 258: Performed by: NURSE PRACTITIONER

## 2024-06-20 RX ORDER — PREDNISONE 20 MG/1
40 TABLET ORAL DAILY
Status: DISCONTINUED | OUTPATIENT
Start: 2024-06-21 | End: 2024-06-21 | Stop reason: HOSPADM

## 2024-06-20 RX ORDER — PREDNISONE 20 MG/1
40 TABLET ORAL DAILY
Status: DISCONTINUED | OUTPATIENT
Start: 2024-06-20 | End: 2024-06-20

## 2024-06-20 RX ADMIN — WATER 1000 MG: 1 INJECTION INTRAMUSCULAR; INTRAVENOUS; SUBCUTANEOUS at 15:12

## 2024-06-20 RX ADMIN — CEFEPIME 2000 MG: 2 INJECTION, POWDER, FOR SOLUTION INTRAVENOUS at 10:51

## 2024-06-20 RX ADMIN — PREDNISONE 20 MG: 20 TABLET ORAL at 10:43

## 2024-06-20 RX ADMIN — PANTOPRAZOLE SODIUM 40 MG: 40 INJECTION, POWDER, FOR SOLUTION INTRAVENOUS at 10:43

## 2024-06-20 RX ADMIN — PSEUDOEPHEDRINE HCL 60 MG: 30 TABLET, FILM COATED ORAL at 12:36

## 2024-06-20 RX ADMIN — PSEUDOEPHEDRINE HCL 60 MG: 30 TABLET, FILM COATED ORAL at 19:22

## 2024-06-20 RX ADMIN — PSEUDOEPHEDRINE HCL 60 MG: 30 TABLET, FILM COATED ORAL at 06:11

## 2024-06-20 RX ADMIN — FLUTICASONE PROPIONATE 1 SPRAY: 50 SPRAY, METERED NASAL at 10:52

## 2024-06-20 NOTE — PROGRESS NOTES
PRN    Or    acetaminophen (TYLENOL) suppository 650 mg  650 mg Rectal Q6H PRN    heparin (porcine) injection 5,000 Units  5,000 Units SubCUTAneous 3 times per day    lactated ringers IV soln infusion   IntraVENous Continuous    pantoprazole (PROTONIX) 40 mg in sodium chloride (PF) 0.9 % 10 mL injection  40 mg IntraVENous Daily    oxyCODONE (ROXICODONE) immediate release tablet 5 mg  5 mg Oral Q4H PRN    HYDROmorphone HCl PF (DILAUDID) injection 1 mg  1 mg IntraVENous Q4H PRN    magic (miracle) mouthwash  5 mL Swish & Spit 4x Daily PRN    ceFEPIme (MAXIPIME) 2,000 mg in sodium chloride 0.9 % 100 mL IVPB (mini-bag)  2,000 mg IntraVENous Q24H      Allergies   Allergen Reactions    Pcn [Penicillins] Rash       Objective:  Vitals:    Vitals:    06/19/24 0746 06/19/24 1000 06/19/24 1200 06/19/24 1924   BP: 119/63   (!) 107/54   Pulse: 69 68 76 70   Resp: 20   16   Temp: 98.8 °F (37.1 °C)   97.7 °F (36.5 °C)   TempSrc: Oral   Oral   SpO2: 95%   94%   Weight:       Height:         Intake and Output:  No intake/output data recorded.  No intake/output data recorded.    Physical Examination:  General: NAD,Conversant   Neck:  Supple, no mass  Resp:  Lungs CTA B/L, no wheezing , normal respiratory effort  CV:  RRR,  no murmur or rub, LE edema  GI:  Soft, NT, + BS, no HS megaly  Neurologic:  Non focal  Psych:             AAO x 3 appropriate affect   Skin:  No Rash      []    High complexity decision making was performed  []    Patient is at high-risk of decompensation with multiple organ involvement    Lab Data Personally Reviewed: I have reviewed all the pertinent labs, microbiology data and radiology studies during assessment.    Labs:  Recent Labs     06/17/24 2054 06/18/24  0652 06/19/24  0632    136 138   K 4.1 4.3 3.8   CL 96* 105 107   CO2 28 24 26   GLUCOSE 124* 108* 75   BUN 41* 44* 39*   CREATININE 2.33* 1.77* 1.19*   CALCIUM 9.5 9.0 8.8       Recent Labs     06/17/24  2054 06/18/24  0652 06/19/24  0632   WBC

## 2024-06-20 NOTE — PROGRESS NOTES
[address], [facility] [place] [address]  Phone: [phone]   [phone]    www.[email or web address]     Nephrology Progress Note    Patient Name : Shala Sr      : 1955     MRN : [id number]  Date of Admission : 2024  Date of Servive : 24    CC:  Follow up for GERRI       Assessment and Plan   GERRI :  - resolving ATN   - Cr pending this AM  - d/c IVF  - if Cr stable, ok for d/c from renal perspective    UTI  - abx per primary team     Mesenteric Panniculitis   - mx per primary team / surgery      HTN   - continue to hold valsartan and HCTZ     Obesity   - hold semaglutide        Interval History:  Seen and examined.  Labs pending.  Feeling well.  Tolerated breakfast.  No n/v/d reported.  Denies cp or sob    Review of Systems: A comprehensive review of systems was negative.    Current Medications:   Current Facility-Administered Medications   Medication Dose Route Frequency    predniSONE (DELTASONE) tablet 20 mg  20 mg Oral Daily    Followed by    [START ON 2024] predniSONE (DELTASONE) tablet 15 mg  15 mg Oral Daily    Followed by    [START ON 2024] predniSONE (DELTASONE) tablet 10 mg  10 mg Oral Daily    Followed by    [START ON 2024] predniSONE (DELTASONE) tablet 5 mg  5 mg Oral Daily    fluticasone (FLONASE) 50 MCG/ACT nasal spray 1 spray  1 spray Each Nostril Daily    pseudoephedrine (SUDAFED) tablet 60 mg  60 mg Oral Q6H    sodium chloride flush 0.9 % injection 5-40 mL  5-40 mL IntraVENous 2 times per day    sodium chloride flush 0.9 % injection 5-40 mL  5-40 mL IntraVENous PRN    0.9 % sodium chloride infusion   IntraVENous PRN    ondansetron (ZOFRAN-ODT) disintegrating tablet 4 mg  4 mg Oral Q8H PRN    Or    ondansetron (ZOFRAN) injection 4 mg  4 mg IntraVENous Q6H PRN    polyethylene glycol (GLYCOLAX) packet 17 g  17 g Oral Daily PRN    acetaminophen (TYLENOL) tablet 650 mg  650 mg Oral Q6H PRN    Or

## 2024-06-20 NOTE — PROGRESS NOTES
Hospitalist Progress Note  CHE Bass NP  Answering service: [phone]        Date of Service:  2024  NAME:  Shala Sr  :  1955  MRN:  [id number]      Admission Summary:   Shala Sr is a 69 y.o. female with past medical history significant for hypertension presented at [facility] emergency room at [facility] with multiple medical complaints including nausea and vomiting and abdominal pain.  Patient symptoms started about 3 weeks ago and progressively getting worse.  Patient has also been having fever at home as well as rigors and chills.  The abdominal pain is diffuse in location, no radiation, no known aggravating or relieving factors, dull ache on the right 5/10 in severity.  Also the patient started taking Ozempic for weight loss 2 months ago.  She was seen at [facility] this past Saturday and tested negative for flu and COVID.  Patient came to the emergency room because of worsening symptoms.  CT scan of the abdomen and pelvis done on arrival at the emergency room showed inflammatory changes of the mesentery with prominent lymph nodes consistent with mesenteric panniculitis.  Urinalysis is consistent with acute cystitis.  Patient was started on Rocephin and referred to the hospitalist service for admission       Interval history / Subjective:   Cr 1.19  Still having diarrhea after eating  Still has appetite suppressant affect from Ozempic last week  Pending stool studies  Abx de escalated for e coli UTI  Discussed at length unknown etiology of mesenteric panniculitis, treatment being steroids, discussed possibility of adding Tamoxifen, reviewed side effects of both medications. Pt would not like to add Tamoxifen at this time.  Pt is hopeful to go home tomorrow. Pt understands prednisone would be for at least 3 months and she would be able to follow up with her

## 2024-06-20 NOTE — CARE COORDINATION
Medicare pt has received, reviewed, and signed 2nd IM letter informing them of their right to appeal the discharge.  Signed copy has been placed on pt bedside chart.      [facility]  Care Management Department  Ph:[phone]

## 2024-06-21 VITALS
TEMPERATURE: 98 F | BODY MASS INDEX: 31.26 KG/M2 | DIASTOLIC BLOOD PRESSURE: 91 MMHG | HEIGHT: 65 IN | OXYGEN SATURATION: 95 % | RESPIRATION RATE: 20 BRPM | WEIGHT: 187.61 LBS | SYSTOLIC BLOOD PRESSURE: 153 MMHG | HEART RATE: 76 BPM

## 2024-06-21 LAB
ALBUMIN SERPL-MCNC: 2 G/DL (ref 3.5–5)
ALBUMIN/GLOB SERPL: 0.4 (ref 1.1–2.2)
ALP SERPL-CCNC: 45 U/L (ref 45–117)
ALT SERPL-CCNC: 20 U/L (ref 12–78)
ANION GAP SERPL CALC-SCNC: 4 MMOL/L (ref 5–15)
AST SERPL-CCNC: 28 U/L (ref 15–37)
BASOPHILS # BLD: 0 K/UL (ref 0–0.1)
BASOPHILS NFR BLD: 0 % (ref 0–1)
BILIRUB SERPL-MCNC: 0.3 MG/DL (ref 0.2–1)
BUN SERPL-MCNC: 25 MG/DL (ref 6–20)
BUN/CREAT SERPL: 23 (ref 12–20)
CALCIUM SERPL-MCNC: 8.7 MG/DL (ref 8.5–10.1)
CHLORIDE SERPL-SCNC: 106 MMOL/L (ref 97–108)
CO2 SERPL-SCNC: 25 MMOL/L (ref 21–32)
CREAT SERPL-MCNC: 1.1 MG/DL (ref 0.55–1.02)
DIFFERENTIAL METHOD BLD: ABNORMAL
EOSINOPHIL # BLD: 0 K/UL (ref 0–0.4)
EOSINOPHIL NFR BLD: 0 % (ref 0–7)
ERYTHROCYTE [DISTWIDTH] IN BLOOD BY AUTOMATED COUNT: 13.3 % (ref 11.5–14.5)
GLOBULIN SER CALC-MCNC: 4.9 G/DL (ref 2–4)
GLUCOSE SERPL-MCNC: 111 MG/DL (ref 65–100)
HCT VFR BLD AUTO: 32.2 % (ref 35–47)
HGB BLD-MCNC: 10.5 G/DL (ref 11.5–16)
IMM GRANULOCYTES # BLD AUTO: 0 K/UL
IMM GRANULOCYTES NFR BLD AUTO: 0 %
LYMPHOCYTES # BLD: 1.7 K/UL (ref 0.8–3.5)
LYMPHOCYTES NFR BLD: 22 % (ref 12–49)
MCH RBC QN AUTO: 29 PG (ref 26–34)
MCHC RBC AUTO-ENTMCNC: 32.6 G/DL (ref 30–36.5)
MCV RBC AUTO: 89 FL (ref 80–99)
MONOCYTES # BLD: 0.4 K/UL (ref 0–1)
MONOCYTES NFR BLD: 5 % (ref 5–13)
NEUTS SEG # BLD: 5.7 K/UL (ref 1.8–8)
NEUTS SEG NFR BLD: 73 % (ref 32–75)
NRBC # BLD: 0 K/UL (ref 0–0.01)
NRBC BLD-RTO: 0 PER 100 WBC
PLATELET # BLD AUTO: 441 K/UL (ref 150–400)
PMV BLD AUTO: 9.1 FL (ref 8.9–12.9)
POTASSIUM SERPL-SCNC: 4.3 MMOL/L (ref 3.5–5.1)
PROT SERPL-MCNC: 6.9 G/DL (ref 6.4–8.2)
RBC # BLD AUTO: 3.62 M/UL (ref 3.8–5.2)
RBC MORPH BLD: ABNORMAL
SODIUM SERPL-SCNC: 135 MMOL/L (ref 136–145)
WBC # BLD AUTO: 7.8 K/UL (ref 3.6–11)

## 2024-06-21 PROCEDURE — 85025 COMPLETE CBC W/AUTO DIFF WBC: CPT

## 2024-06-21 PROCEDURE — 94760 N-INVAS EAR/PLS OXIMETRY 1: CPT

## 2024-06-21 PROCEDURE — 6370000000 HC RX 637 (ALT 250 FOR IP): Performed by: NURSE PRACTITIONER

## 2024-06-21 PROCEDURE — 2580000003 HC RX 258: Performed by: INTERNAL MEDICINE

## 2024-06-21 PROCEDURE — 36415 COLL VENOUS BLD VENIPUNCTURE: CPT

## 2024-06-21 PROCEDURE — 6360000002 HC RX W HCPCS: Performed by: INTERNAL MEDICINE

## 2024-06-21 PROCEDURE — 80053 COMPREHEN METABOLIC PANEL: CPT

## 2024-06-21 PROCEDURE — C9113 INJ PANTOPRAZOLE SODIUM, VIA: HCPCS | Performed by: INTERNAL MEDICINE

## 2024-06-21 RX ORDER — PANTOPRAZOLE SODIUM 40 MG/1
40 TABLET, DELAYED RELEASE ORAL
Status: DISCONTINUED | OUTPATIENT
Start: 2024-06-22 | End: 2024-06-21 | Stop reason: HOSPADM

## 2024-06-21 RX ORDER — PREDNISONE 20 MG/1
40 TABLET ORAL DAILY
Qty: 180 TABLET | Refills: 0 | Status: SHIPPED | OUTPATIENT
Start: 2024-06-22 | End: 2024-09-20

## 2024-06-21 RX ADMIN — PREDNISONE 40 MG: 20 TABLET ORAL at 09:50

## 2024-06-21 RX ADMIN — FLUTICASONE PROPIONATE 1 SPRAY: 50 SPRAY, METERED NASAL at 09:51

## 2024-06-21 RX ADMIN — PSEUDOEPHEDRINE HCL 60 MG: 30 TABLET, FILM COATED ORAL at 03:39

## 2024-06-21 RX ADMIN — SODIUM CHLORIDE, PRESERVATIVE FREE 10 ML: 5 INJECTION INTRAVENOUS at 09:50

## 2024-06-21 RX ADMIN — PANTOPRAZOLE SODIUM 40 MG: 40 INJECTION, POWDER, FOR SOLUTION INTRAVENOUS at 09:50

## 2024-06-21 NOTE — PROGRESS NOTES
Clinical Pharmacy Note: IV to PO Automatic Conversion  Please note: Shala AMIRA DeleonSr’s medication(s) (pantoprazole) has/have been changed from IV to PO based on the following critiera:    Patient is tolerating oral medications  Patient is tolerating a diet more advanced than clear liquids  Patient is not requiring vasopressors    This IV to PO conversion is based on the [employer] approved automatic conversion policy for eligible patients.  Please call with questions.

## 2024-06-21 NOTE — PLAN OF CARE
Problem: Pain  Goal: Verbalizes/displays adequate comfort level or baseline comfort level  Outcome: Adequate for Discharge     Problem: Safety - Adult  Goal: Free from fall injury  Outcome: Adequate for Discharge  Flowsheets (Taken 6/21/2024 6934)  Free From Fall Injury: Instruct family/caregiver on patient safety

## 2024-06-21 NOTE — DISCHARGE SUMMARY
Discharge Summary       PATIENT ID: Shala Sr  MRN: [id number]   YOB: 1955    DATE OF ADMISSION: 6/17/2024  8:36 PM    DATE OF DISCHARGE: 6/21/2024  PRIMARY CARE PROVIDER: Jerome Shaw DO     ATTENDING PHYSICIAN: Dr. Kwok  DISCHARGING PROVIDER: HCE Bass NP    To contact this individual call [phone] and ask the  to page.  If unavailable ask to be transferred the Adult Hospitalist Department.    CONSULTATIONS: IP CONSULT TO HOSPITALIST  IP CONSULT TO NEPHROLOGY  IP CONSULT TO GENERAL SURGERY    PROCEDURES/SURGERIES: * No surgery found *    ADMITTING DIAGNOSES & HOSPITAL COURSE:   Shala Sr is a 69 y.o. female with past medical history significant for hypertension presented at [facility] emergency room at [facility] with multiple medical complaints including nausea and vomiting and abdominal pain. Patient symptoms started about 3 weeks ago and progressively getting worse. Patient has also been having fever at home as well as rigors and chills. The abdominal pain is diffuse in location, no radiation, no known aggravating or relieving factors, dull ache on the right 5/10 in severity. Also the patient started taking Ozempic for weight loss 2 months ago. She was seen at [facility] first this past Saturday and tested negative for flu and COVID. Patient came to the emergency room because of worsening symptoms. CT scan of the abdomen and pelvis done on arrival at the emergency room showed inflammatory changes of the mesentery with prominent lymph nodes consistent with mesenteric panniculitis. Urinalysis is consistent with acute cystitis. Patient was started on Rocephin and referred to the hospitalist service for admission     6/21/2024  VSS  Tolerated 2 meals  Diarrhea improved  Enteric panel negative  Stable for discharge    DISCHARGE DIAGNOSES / PLAN:      Mesenteric panniculitis   - Surgery evaluated, no interventions  - check stool enteric

## 2024-06-21 NOTE — DISCHARGE INSTRUCTIONS
Discharge Instructions       PATIENT ID: Shala Sr  MRN: [id number]   YOB: 1955    DATE OF ADMISSION: 6/17/2024   DATE OF DISCHARGE: 6/21/2024    PRIMARY CARE PROVIDER: Jerome Shaw     ATTENDING PHYSICIAN: Latanya Kwok MD   DISCHARGING PROVIDER: CHE Bass NP    To contact this individual call [phone] and ask the  to page.   If unavailable ask to be transferred the Adult Hospitalist Department.    DISCHARGE DIAGNOSES: Mesenteric Panniculitis    CONSULTATIONS: nephrology    PROCEDURES/SURGERIES: * No surgery found *    PENDING TEST RESULTS:   At the time of discharge the following test results are still pending: none    FOLLOW UP APPOINTMENTS:    PCP    ADDITIONAL CARE RECOMMENDATIONS:   You have been diagnosed with mesenteric panniculitis.  You may see all your imaging results and lab results on [email or web address].  You should take prednisone 40 mg daily for at least 3 months.  Please discuss this with your PCP.  Stop Ozempic as we discussed.    DIET: [place] diet and progress to regular diet    ACTIVITY: activity as tolerated    WOUND CARE: None    EQUIPMENT needed: None      DISCHARGE MEDICATIONS:   See Medication Reconciliation Form    It is important that you take the medication exactly as they are prescribed.   Keep your medication in the bottles provided by the pharmacist and keep a list of the medication names, dosages, and times to be taken in your wallet.   Do not take other medications without consulting your doctor.       NOTIFY YOUR PHYSICIAN FOR ANY OF THE FOLLOWING:   Fever over 101 degrees for 24 hours.   Chest pain, shortness of breath, fever, chills, nausea, vomiting, diarrhea, change in mentation, falling, weakness, bleeding. Severe pain or pain not relieved by medications.  Or, any other signs or symptoms that you may have questions about.      DISPOSITION:  x  Home With:   OT  PT  HH  RN       SNF/Inpatient Rehab/LTAC

## 2024-06-21 NOTE — PROGRESS NOTES
[address], [facility] [place] [address]  Phone: [phone]   [phone]    [email or web address]     Nephrology Progress Note    Patient Name : Shala Sr      : 1955     MRN : [id number]  Date of Admission : 2024  Date of Servive : 24    CC:  Follow up for GERRI       Assessment and Plan   GERRI :  - resolving ATN   - Cr stable  - ok for d/c from renal perspective    UTI  - abx per primary team     Mesenteric Panniculitis   - mx per primary team / surgery      HTN   - continue to hold valsartan and HCTZ     Obesity   - hold semaglutide        Interval History:  Seen and examined.  Cr stable at 1.1.  poor appetite.  Some diarrhea.  No reported n/v.  No cp or sob  Review of Systems: A comprehensive review of systems was negative.    Current Medications:   Current Facility-Administered Medications   Medication Dose Route Frequency    cefTRIAXone (ROCEPHIN) 1,000 mg in sterile water 10 mL IV syringe  1,000 mg IntraVENous Q24H    predniSONE (DELTASONE) tablet 40 mg  40 mg Oral Daily    fluticasone (FLONASE) 50 MCG/ACT nasal spray 1 spray  1 spray Each Nostril Daily    pseudoephedrine (SUDAFED) tablet 60 mg  60 mg Oral Q6H    sodium chloride flush 0.9 % injection 5-40 mL  5-40 mL IntraVENous 2 times per day    sodium chloride flush 0.9 % injection 5-40 mL  5-40 mL IntraVENous PRN    0.9 % sodium chloride infusion   IntraVENous PRN    ondansetron (ZOFRAN-ODT) disintegrating tablet 4 mg  4 mg Oral Q8H PRN    Or    ondansetron (ZOFRAN) injection 4 mg  4 mg IntraVENous Q6H PRN    polyethylene glycol (GLYCOLAX) packet 17 g  17 g Oral Daily PRN    acetaminophen (TYLENOL) tablet 650 mg  650 mg Oral Q6H PRN    Or    acetaminophen (TYLENOL) suppository 650 mg  650 mg Rectal Q6H PRN    heparin (porcine) injection 5,000 Units  5,000 Units SubCUTAneous 3 times per day    pantoprazole (PROTONIX) 40 mg in sodium chloride (PF) 0.9 % 10 mL injection  40  input(s): \"CPK\", \"CKMB\", \"TROPONINI\" in the last 72 hours.    Invalid input(s): \"B-NP\"  Invalid input(s): \"PHI\", \"PCO2I\", \"PO2I\", \"FIO2I\"     Ventilator:       Microbiology:  No results found for: \"SDES\"  No components found for: \"CULT\"      I have reviewed the flowsheets.  Chart and Pertinent Notes have been reviewed.   No change in PMH ,family and social history from Consult note.      Jose Mars MD  [facility]

## 2024-06-22 LAB — WBC #/AREA STL HPF: NORMAL /HPF (ref 0–4)

## 2024-06-23 LAB
BACTERIA SPEC CULT: NORMAL
BACTERIA SPEC CULT: NORMAL
SERVICE CMNT-IMP: NORMAL
SERVICE CMNT-IMP: NORMAL

## 2024-10-08 RX ORDER — SODIUM CHLORIDE 0.9 % (FLUSH) 0.9 %
5-40 SYRINGE (ML) INJECTION EVERY 12 HOURS SCHEDULED
Status: DISCONTINUED | OUTPATIENT
Start: 2024-10-08 | End: 2024-10-09 | Stop reason: HOSPADM

## 2024-10-08 RX ORDER — SODIUM CHLORIDE 0.9 % (FLUSH) 0.9 %
5-40 SYRINGE (ML) INJECTION PRN
Status: DISCONTINUED | OUTPATIENT
Start: 2024-10-08 | End: 2024-10-09 | Stop reason: HOSPADM

## 2024-10-08 RX ORDER — LEVOFLOXACIN 5 MG/ML
500 INJECTION, SOLUTION INTRAVENOUS
Status: COMPLETED | OUTPATIENT
Start: 2024-10-09 | End: 2024-10-09

## 2024-10-08 RX ORDER — SODIUM CHLORIDE 9 MG/ML
25 INJECTION, SOLUTION INTRAVENOUS PRN
Status: DISCONTINUED | OUTPATIENT
Start: 2024-10-08 | End: 2024-10-09 | Stop reason: HOSPADM

## 2024-10-09 ENCOUNTER — ANESTHESIA EVENT (OUTPATIENT)
Facility: HOSPITAL | Age: 69
End: 2024-10-09
Payer: MEDICARE

## 2024-10-09 ENCOUNTER — ANESTHESIA (OUTPATIENT)
Facility: HOSPITAL | Age: 69
End: 2024-10-09
Payer: MEDICARE

## 2024-10-09 ENCOUNTER — HOSPITAL ENCOUNTER (OUTPATIENT)
Facility: HOSPITAL | Age: 69
Setting detail: OUTPATIENT SURGERY
Discharge: HOME | End: 2024-10-09
Attending: INTERNAL MEDICINE | Admitting: INTERNAL MEDICINE
Payer: MEDICARE

## 2024-10-09 VITALS
OXYGEN SATURATION: 97 % | HEART RATE: 58 BPM | BODY MASS INDEX: 28.93 KG/M2 | WEIGHT: 180 LBS | TEMPERATURE: 97.7 F | DIASTOLIC BLOOD PRESSURE: 104 MMHG | HEIGHT: 66 IN | RESPIRATION RATE: 25 BRPM | SYSTOLIC BLOOD PRESSURE: 120 MMHG

## 2024-10-09 PROCEDURE — 6360000002 HC RX W HCPCS: Performed by: NURSE ANESTHETIST, CERTIFIED REGISTERED

## 2024-10-09 PROCEDURE — 2500000003 HC RX 250 WO HCPCS: Performed by: NURSE ANESTHETIST, CERTIFIED REGISTERED

## 2024-10-09 PROCEDURE — 3600007502: Performed by: INTERNAL MEDICINE

## 2024-10-09 PROCEDURE — 6360000002 HC RX W HCPCS: Performed by: INTERNAL MEDICINE

## 2024-10-09 PROCEDURE — 2709999900 HC NON-CHARGEABLE SUPPLY: Performed by: INTERNAL MEDICINE

## 2024-10-09 PROCEDURE — 3600007512: Performed by: INTERNAL MEDICINE

## 2024-10-09 PROCEDURE — 7100000010 HC PHASE II RECOVERY - FIRST 15 MIN: Performed by: INTERNAL MEDICINE

## 2024-10-09 PROCEDURE — 2580000003 HC RX 258: Performed by: INTERNAL MEDICINE

## 2024-10-09 PROCEDURE — 88305 TISSUE EXAM BY PATHOLOGIST: CPT

## 2024-10-09 PROCEDURE — 7100000011 HC PHASE II RECOVERY - ADDTL 15 MIN: Performed by: INTERNAL MEDICINE

## 2024-10-09 PROCEDURE — C1753 CATH, INTRAVAS ULTRASOUND: HCPCS | Performed by: INTERNAL MEDICINE

## 2024-10-09 PROCEDURE — 3700000000 HC ANESTHESIA ATTENDED CARE: Performed by: INTERNAL MEDICINE

## 2024-10-09 PROCEDURE — 3700000001 HC ADD 15 MINUTES (ANESTHESIA): Performed by: INTERNAL MEDICINE

## 2024-10-09 RX ORDER — PREDNISONE 20 MG/1
1 TABLET ORAL
COMMUNITY

## 2024-10-09 RX ADMIN — PROPOFOL 50 MG: 10 INJECTION, EMULSION INTRAVENOUS at 12:10

## 2024-10-09 RX ADMIN — LEVOFLOXACIN 500 MG: 5 INJECTION, SOLUTION INTRAVENOUS at 11:50

## 2024-10-09 RX ADMIN — LEVOFLOXACIN 500 MG: 5 INJECTION, SOLUTION INTRAVENOUS at 12:10

## 2024-10-09 RX ADMIN — SODIUM CHLORIDE 25 ML: 9 INJECTION, SOLUTION INTRAVENOUS at 11:49

## 2024-10-09 RX ADMIN — LIDOCAINE HYDROCHLORIDE 80 MG: 20 INJECTION, SOLUTION EPIDURAL; INFILTRATION; INTRACAUDAL; PERINEURAL at 12:10

## 2024-10-09 RX ADMIN — PROPOFOL 130 MCG/KG/MIN: 10 INJECTION, EMULSION INTRAVENOUS at 12:11

## 2024-10-09 ASSESSMENT — PAIN - FUNCTIONAL ASSESSMENT: PAIN_FUNCTIONAL_ASSESSMENT: 0-10

## 2024-10-09 NOTE — DISCHARGE INSTRUCTIONS
Shala Deleonton  [id number]  1955    EUS DISCHARGE INSTRUCTIONS  Discomfort:  Sore throat- throat lozenges or warm salt water gargle  redness at IV site- apply warm compress to area; if redness or soreness persist- contact your physician  Gaseous discomfort- walking, belching will help relieve any discomfort  You may not operate a vehicle for 12 hours  You may not engage in an occupation involving machinery or appliances for rest of today  You may not drink alcoholic beverages for at least 12 hours  Avoid making any critical decisions for at least 24 hour  DIET  You may resume your regular diet - however -  remember your colon is empty and a heavy meal will produce gas.   Avoid these foods:  vegetables, fried / greasy foods, carbonated drinks    MEDICATIONS:  [unfilled]    ACTIVITY  You may resume your normal daily activities until tomorrow AM;  Spend the remainder of the day resting -  avoid any strenuous activity.  CALL M.D.  ANY SIGN OF   Increasing pain, nausea, vomiting  Abdominal distension (swelling)  New increased bleeding (oral or rectal)  Fever (chills)  Pain in chest area  Bloody discharge from nose or mouth  Shortness of breath    You may not take any Advil, Aspirin, Ibuprofen, Motrin, Aleve, or Goody’s for 10 days, ONLY  Tylenol as needed for pain.    IMPRESSION:  Endoscopic:  Normal esophagus  Mild, segmental, non-erosive gastropathy in body and antrum. Biopsied  Stomach otherwise normal, including retroflexion  Normal duodenal bulb and 2nd portion of the duodenum    Ultrasound:   Pancreas:     19 mm multicystic, septated cyst in BOP WITHOUT high risk features (I.e. no mural nodules, enhancement, associated mass). Given this no FNA was performed. Note PD was mildly dilated downstream     Interventions: None    Recommendations:   Follow up pathology  Repeat MRI/MRCP in 6 months because of mild PD dilation    Follow-up Instructions:   Call Dr. Torres for the results of procedure / biopsy in

## 2024-10-09 NOTE — ANESTHESIA PRE PROCEDURE
Standard drinks or equivalent per week                                Counseling given: Not Answered      Vital Signs (Current): There were no vitals filed for this visit.                                           BP Readings from Last 3 Encounters:   06/21/24 (!) 153/91       NPO Status: Time of last liquid consumption: 1030 (anesthesia MD aware)                        Time of last solid consumption: 2000                        Date of last liquid consumption: 10/09/24                        Date of last solid food consumption: 10/08/24    BMI:   Wt Readings from Last 3 Encounters:   06/17/24 85.1 kg (187 lb 9.8 oz)     There is no height or weight on file to calculate BMI.    CBC:   Lab Results   Component Value Date/Time    WBC 7.8 06/21/2024 03:31 AM    RBC 3.62 06/21/2024 03:31 AM    HGB 10.5 06/21/2024 03:31 AM    HCT 32.2 06/21/2024 03:31 AM    MCV 89.0 06/21/2024 03:31 AM    RDW 13.3 06/21/2024 03:31 AM     06/21/2024 03:31 AM       CMP:   Lab Results   Component Value Date/Time     06/21/2024 03:31 AM    K 4.3 06/21/2024 03:31 AM     06/21/2024 03:31 AM    CO2 25 06/21/2024 03:31 AM    BUN 25 06/21/2024 03:31 AM    CREATININE 1.10 06/21/2024 03:31 AM    LABGLOM 55 06/21/2024 03:31 AM    GLUCOSE 111 06/21/2024 03:31 AM    CALCIUM 8.7 06/21/2024 03:31 AM    BILITOT 0.3 06/21/2024 03:31 AM    ALKPHOS 45 06/21/2024 03:31 AM    AST 28 06/21/2024 03:31 AM    ALT 20 06/21/2024 03:31 AM       POC Tests: No results for input(s): \"POCGLU\", \"POCNA\", \"POCK\", \"POCCL\", \"POCBUN\", \"POCHEMO\", \"POCHCT\" in the last 72 hours.    Coags: No results found for: \"PROTIME\", \"INR\", \"APTT\"    HCG (If Applicable): No results found for: \"PREGTESTUR\", \"PREGSERUM\", \"HCG\", \"HCGQUANT\"     ABGs: No results found for: \"PHART\", \"PO2ART\", \"EGX0ZEU\", \"PGE4RGC\", \"BEART\", \"G8LBMIKR\"     Type & Screen (If Applicable):  No results found for: \"ABORH\", \"LABANTI\"    Drug/Infectious Status (If Applicable):  No results found for:

## 2024-10-09 NOTE — ANESTHESIA POSTPROCEDURE EVALUATION
Department of Anesthesiology  Postprocedure Note    Patient: Shala Sr  MRN: [id number]  YOB: 1955  Date of evaluation: 10/9/2024    Procedure Summary       Date: 10/09/24 Room / Location: [facility] ENDO 03 / [facility] ENDOSCOPY    Anesthesia Start: 1205 Anesthesia Stop: 1234    Procedure: ESOPHAGOGASTRODUODENOSCOPY ENDOSCOPIC ULTRASOUND FINE NEEDLE ASPIRATION Diagnosis:       Pancreatic mass      (Pancreatic mass [K86.89])    Surgeons: Carter Torres MD Responsible Provider: Nick Erazo MD    Anesthesia Type: MAC, TIVA ASA Status: 2            Anesthesia Type: MAC, TIVA    Mahendra Phase I: Mahendra Score: 10    Mahendra Phase II: Mahendra Score: 10    Anesthesia Post Evaluation    Patient location during evaluation: PACU  Patient participation: complete - patient participated  Level of consciousness: awake and alert  Airway patency: patent  Nausea & Vomiting: no nausea  Cardiovascular status: hemodynamically stable  Respiratory status: acceptable  Hydration status: euvolemic    No notable events documented.

## 2024-10-09 NOTE — H&P
Gastroenterology Outpatient History and Physical    Patient: Shala Sr    Physician: Carter Torres MD    Chief Complaint: Pancreatic cyst  History of Present Illness: No GI complaints    History:  No past medical history on file. No past surgical history on file.   Social History     Socioeconomic History    Marital status: Single   Tobacco Use    Smoking status: Former     Current packs/day: 2.00     Types: Cigarettes    Smokeless tobacco: Never   Substance and Sexual Activity    Alcohol use: Yes     Alcohol/week: 3.0 standard drinks of alcohol     Types: 3 Standard drinks or equivalent per week    Drug use: Never     Social Determinants of Health     Food Insecurity: No Food Insecurity (6/18/2024)    Hunger Vital Sign     Worried About Running Out of Food in the Last Year: Never true     Ran Out of Food in the Last Year: Never true   Transportation Needs: No Transportation Needs (6/18/2024)    PRAPARE - Transportation     Lack of Transportation (Medical): No     Lack of Transportation (Non-Medical): No   Housing Stability: Low Risk  (6/18/2024)    Housing Stability Vital Sign     Unable to Pay for Housing in the Last Year: No     Number of Places Lived in the Last Year: 1     Unstable Housing in the Last Year: No    No family history on file.   Patient Active Problem List   Diagnosis    Mesenteric panniculitis (HCC)       Allergies:   Allergies   Allergen Reactions    Pcn [Penicillins] Rash     Medications:   Prior to Admission medications    Medication Sig Start Date End Date Taking? Authorizing Provider   valsartan (DIOVAN) 40 MG tablet Take by mouth daily    Oliver Almeida MD   hydroCHLOROthiazide 12.5 MG tablet Take 1 tablet by mouth daily    ProviderOliver MD     Physical Exam:   Vital Signs: There were no vitals taken for this visit.  General: well developed, well nourished   HEENT: unremarkable   Heart: regular rhythm no mumur    Lungs: clear   Abdominal:  benign   Neurological:

## 2024-10-09 NOTE — PROGRESS NOTES
ARRIVAL INFORMATION:  Verified patient name and date of birth, scheduled procedure, and informed consent.       Receive texts: yes    Belongings with patient include:  Clothing,    GI FOCUSED ASSESSMENT:  Neuro: Awake, alert, oriented x4  Respiratory: even and unlabored   GI: soft and non-distended  EKG Rhythm: normal sinus rhythm    Education:Reviewed general discharge instructions and  information. The risks and benefits of the bite block have been explained to patient.  Patient verbalizes understanding.

## 2024-10-09 NOTE — PROGRESS NOTES
1212- EGD started at this time    1215- EGD scope out.     1220- EUS scope in at this time.     1227- No samples collected intra-procedure. Scope out.     1229- Endoscope was pre-cleaned at bedside immediately following procedure by KURT Hollingsworth    Endoscope was pre-cleaned at bedside immediately following procedure by KURT Hollingsworth

## 2025-04-11 ENCOUNTER — TRANSCRIBE ORDERS (OUTPATIENT)
Facility: HOSPITAL | Age: 70
End: 2025-04-11

## 2025-04-11 DIAGNOSIS — K86.89 DILATED PANCREATIC DUCT: ICD-10-CM

## 2025-04-11 DIAGNOSIS — K65.4 SCLEROSING MESENTERITIS (HCC): Primary | ICD-10-CM

## 2025-04-24 ENCOUNTER — HOSPITAL ENCOUNTER (OUTPATIENT)
Facility: HOSPITAL | Age: 70
Discharge: HOME | End: 2025-04-27
Payer: MEDICARE

## 2025-04-24 DIAGNOSIS — K65.4 SCLEROSING MESENTERITIS (HCC): ICD-10-CM

## 2025-04-24 DIAGNOSIS — K86.89 DILATED PANCREATIC DUCT: ICD-10-CM

## 2025-04-24 PROCEDURE — A9575 INJ GADOTERATE MEGLUMI 0.1ML: HCPCS | Performed by: RADIOLOGY

## 2025-04-24 PROCEDURE — 2500000003 HC RX 250 WO HCPCS: Performed by: RADIOLOGY

## 2025-04-24 PROCEDURE — 74183 MRI ABD W/O CNTR FLWD CNTR: CPT

## 2025-04-24 PROCEDURE — 6360000004 HC RX CONTRAST MEDICATION: Performed by: RADIOLOGY

## 2025-04-24 RX ORDER — GADOTERATE MEGLUMINE 376.9 MG/ML
INJECTION INTRAVENOUS
Status: DISCONTINUED
Start: 2025-04-24 | End: 2025-04-24

## 2025-04-24 RX ORDER — GADOTERATE MEGLUMINE 376.9 MG/ML
15 INJECTION INTRAVENOUS ONCE
Status: COMPLETED | OUTPATIENT
Start: 2025-04-24 | End: 2025-04-24

## 2025-04-24 RX ORDER — GADOTERATE MEGLUMINE 376.9 MG/ML
18 INJECTION INTRAVENOUS
Status: COMPLETED | OUTPATIENT
Start: 2025-04-24 | End: 2025-04-24

## 2025-04-24 RX ADMIN — GADOTERATE MEGLUMINE 15 ML: 376.9 INJECTION INTRAVENOUS at 11:00

## 2025-04-24 RX ADMIN — GADOTERATE MEGLUMINE 18 ML: 376.9 INJECTION INTRAVENOUS at 11:01

## (undated) DEVICE — SET ADMIN 16ML TBNG L100IN 2 Y INJ SITE IV PIGGY BK DISP (ORDER IN MULIPLES OF 48)

## (undated) DEVICE — Device: Brand: BALLOON3

## (undated) DEVICE — Device

## (undated) DEVICE — KIT,1200CC RIGID CANISTER,6' AND 20" TUB: Brand: MEDLINE INDUSTRIES, INC.

## (undated) DEVICE — NEEDLE ASPIR 22 GAX0-8 CM 1.65X2.4 MM SHTH EXPECT SLM LN

## (undated) DEVICE — BASIN EMSIS 16OZ GRAPHITE PLAS KID SHP MOLD GRAD FOR ORAL

## (undated) DEVICE — FORCEP BX LG CAP 2.4 MMX120 CM W/ NDL YEL RADIAL JAW 4 DISP

## (undated) DEVICE — IV STRT KT 3282] LSL INDUSTRIES INC]

## (undated) DEVICE — Device: Brand: JELCO

## (undated) DEVICE — CUFF BLD PRSS AD CLTH SGL TB W/ BAYNT CONN ROUNDED CORNER